# Patient Record
Sex: MALE | Employment: FULL TIME | URBAN - METROPOLITAN AREA
[De-identification: names, ages, dates, MRNs, and addresses within clinical notes are randomized per-mention and may not be internally consistent; named-entity substitution may affect disease eponyms.]

---

## 2023-02-07 ENCOUNTER — HOSPITAL ENCOUNTER (OUTPATIENT)
Dept: GENERAL RADIOLOGY | Facility: HOSPITAL | Age: 58
Discharge: HOME | End: 2023-02-07
Attending: PREVENTIVE MEDICINE

## 2023-02-07 ENCOUNTER — OFFICE VISIT (OUTPATIENT)
Dept: OTHER | Facility: HOSPITAL | Age: 58
End: 2023-02-07
Attending: PREVENTIVE MEDICINE

## 2023-02-07 ENCOUNTER — HOSPITAL ENCOUNTER (EMERGENCY)
Facility: HOSPITAL | Age: 58
Discharge: HOME | End: 2023-02-07

## 2023-02-07 DIAGNOSIS — S63.502A LEFT WRIST SPRAIN: ICD-10-CM

## 2023-02-07 DIAGNOSIS — S63.502A LEFT WRIST SPRAIN: Primary | ICD-10-CM

## 2023-02-07 PROCEDURE — 73110 X-RAY EXAM OF WRIST: CPT | Performed by: PREVENTIVE MEDICINE

## 2023-02-07 RX ORDER — HYDROCODONE BITARTRATE AND ACETAMINOPHEN 5; 325 MG/1; MG/1
TABLET ORAL
Qty: 20 TABLET | Refills: 0 | Status: SHIPPED | OUTPATIENT
Start: 2023-02-07

## 2023-02-07 NOTE — PATIENT INSTRUCTIONS
Use sling as needed    Orthopedic follow up planned    Norco # 20   one every 6 hrs, may take 2 at hs as needed for pain

## 2025-11-20 ENCOUNTER — APPOINTMENT (OUTPATIENT)
Dept: NEUROLOGY | Age: 60
End: 2025-11-20